# Patient Record
Sex: MALE | NOT HISPANIC OR LATINO | URBAN - METROPOLITAN AREA
[De-identification: names, ages, dates, MRNs, and addresses within clinical notes are randomized per-mention and may not be internally consistent; named-entity substitution may affect disease eponyms.]

---

## 2018-01-23 ENCOUNTER — ALLSCRIPTS OFFICE VISIT (OUTPATIENT)
Dept: OTHER | Facility: OTHER | Age: 33
End: 2018-01-23

## 2018-01-24 NOTE — PROGRESS NOTES
Assessment   1  Wart of scalp (078 10) (B07 9)    Plan     If the destruction does not work with the cryotherapy, call and we will numb it up scrape it and then cauterize it  Discussion/Summary      While wart was the initial diagnosis on my differential, I cannot exclude the possibility that this was a seborrheic keratotic lesion  The difference in some ways may be academic in that cryotherapy is a legitimate treatment for destruction of that benign lesion as well  Chief Complaint   Patient is here today with complaints of a spot on the left side of his head, noticed it a few months ago  History of Present Illness   HPI: Patient has a wart on his scalp just behind the left ear  It is approximately 1 centimeter it is fleshy  Active Problems   1  Pneumonia (5) (J18 9)    Past Medical History   1  No pertinent past medical history    Family History   Father    1  Family history of hypertension (V17 49) (Z82 49)    Social History    · Coffee   · Denied: History of Drinks caffeinated tea   · Never a smoker   · Denied: History of Patient ingests cola containing caffeine   · Social alcohol use (Z78 9)    Surgical History   1  Denied: History Of Prior Surgery    Allergies   1  Amoxicillin TABS    Vitals    Recorded: [id number] 03:49PM   Temperature 97 7 F   Heart Rate 68   Respiration 16   Systolic 751   Diastolic 84   Weight 539 lb    BMI Calculated 26 15   BSA Calculated 1 92     Physical Exam        Skin      Skin and subcutaneous tissue: Abnormal  -- Wart, fleshy behind left ear  Area was cleaned with alcohol and I subjected to for three and half freeze thaw cycles using the cryotherapy  He tolerated it well           Signatures    Electronically signed by : KAREN Osorio ; Jan 23 2018  4:14PM EST                       (Author)

## 2023-01-19 ENCOUNTER — OFFICE VISIT (OUTPATIENT)
Dept: FAMILY MEDICINE CLINIC | Facility: MEDICAL CENTER | Age: 38
End: 2023-01-19

## 2023-01-19 VITALS
RESPIRATION RATE: 16 BRPM | TEMPERATURE: 98.4 F | SYSTOLIC BLOOD PRESSURE: 120 MMHG | BODY MASS INDEX: 26.31 KG/M2 | WEIGHT: 177.6 LBS | HEART RATE: 71 BPM | DIASTOLIC BLOOD PRESSURE: 80 MMHG | HEIGHT: 69 IN

## 2023-01-19 DIAGNOSIS — K40.90 UNILATERAL INGUINAL HERNIA WITHOUT OBSTRUCTION OR GANGRENE, RECURRENCE NOT SPECIFIED: Primary | ICD-10-CM

## 2023-01-19 NOTE — PROGRESS NOTES
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  For the last few weeks he is noted a bulge on the right side over the inguinal region  It is slightly uncomfortable  He is not constipated, he is active, he does not have a bad cough and he does not do a lot of heavy lifting  /80 (Cuff Size: Standard)   Pulse 71   Temp 98 4 °F (36 9 °C)   Resp 16   Ht 5' 9" (1 753 m)   Wt 80 6 kg (177 lb 9 6 oz)   BMI 26 23 kg/m²     Physical Exam  Constitutional:       General: He is not in acute distress  Appearance: He is well-developed  He is not diaphoretic  HENT:      Head: Normocephalic  Right Ear: External ear normal       Left Ear: External ear normal       Nose: Nose normal       Mouth/Throat:      Mouth: Mucous membranes are moist    Eyes:      Extraocular Movements: Extraocular movements intact  Conjunctiva/sclera: Conjunctivae normal       Pupils: Pupils are equal, round, and reactive to light  Cardiovascular:      Rate and Rhythm: Normal rate  Pulses: Normal pulses  Pulmonary:      Effort: Pulmonary effort is normal  No respiratory distress  Abdominal:      Hernia: A hernia (right sided, through the internal ring, but not through the external VS direct hernia (less likely)) is present  Musculoskeletal:         General: Normal range of motion  Cervical back: Normal range of motion  Skin:     General: Skin is warm and dry  Neurological:      General: No focal deficit present  Mental Status: He is alert and oriented to person, place, and time  Psychiatric:         Behavior: Behavior normal          Thought Content: Thought content normal          Judgment: Judgment normal      Inguinal hernia    Referred to general surgery

## 2023-01-24 ENCOUNTER — CONSULT (OUTPATIENT)
Dept: SURGERY | Facility: CLINIC | Age: 38
End: 2023-01-24

## 2023-01-24 VITALS
HEART RATE: 66 BPM | BODY MASS INDEX: 26.21 KG/M2 | WEIGHT: 177.5 LBS | SYSTOLIC BLOOD PRESSURE: 123 MMHG | DIASTOLIC BLOOD PRESSURE: 82 MMHG | TEMPERATURE: 97.2 F

## 2023-01-24 DIAGNOSIS — K40.90 RIGHT INGUINAL HERNIA: Primary | ICD-10-CM

## 2023-01-24 NOTE — H&P (VIEW-ONLY)
Office Visit - General Surgery  Becky Wadsworth MRN: [id number]  Encounter: [id number]    Assessment and Plan  Problem List Items Addressed This Visit        Other    Right inguinal hernia - Primary     Explained to him what a hernia is and how it would be repaired  Discussed the procedure in detail including risks, benefits, alternatives, and what to expect postoperatively  He understands and is agreeable to go ahead  Plan: Right inguinal hernia repair            Chief Complaint:  Becky Wadsworth is a 44 y o  male who presents for Exam And Consultation (Unilateral Inguinal Hernia)    Subjective  40-year-old male who had noticed some discomfort a little bit of pain in the right groin region few weeks ago and also noted a lump in that location  Cannot remember any inciting event in particular  No change in bowel or bladder habits  He was seen by his family doctor found to have a hernia and sent here for further evaluation  History reviewed  No pertinent past medical history  History reviewed  No pertinent surgical history  History reviewed  No pertinent family history  Medications  No current outpatient medications on file prior to visit  No current facility-administered medications on file prior to visit  Allergies  Allergies   Allergen Reactions   • Amoxicillin Swelling       Review of Systems   Constitutional: Negative for chills and fever  HENT: Negative for ear pain and sore throat  Eyes: Negative for pain and visual disturbance  Respiratory: Negative for cough and shortness of breath  Cardiovascular: Negative for chest pain and palpitations  Gastrointestinal: Negative for abdominal pain and vomiting  Genitourinary: Negative for dysuria and hematuria  Musculoskeletal: Negative for arthralgias and back pain  Skin: Negative for color change and rash  Neurological: Negative for seizures and syncope     All other systems reviewed and are negative  Objective  Vitals:    01/24/23 1044   BP: 123/82   Pulse: 66   Temp: (!) 97 2 °F (36 2 °C)       Physical Exam  Vitals and nursing note reviewed  Constitutional:       General: He is not in acute distress  Appearance: He is well-developed  He is not diaphoretic  HENT:      Head: Normocephalic and atraumatic  Right Ear: External ear normal       Left Ear: External ear normal    Eyes:      General: No scleral icterus  Conjunctiva/sclera: Conjunctivae normal    Neck:      Thyroid: No thyromegaly  Trachea: No tracheal deviation  Cardiovascular:      Rate and Rhythm: Normal rate and regular rhythm  Heart sounds: Normal heart sounds  No murmur heard  Pulmonary:      Effort: Pulmonary effort is normal  No respiratory distress  Breath sounds: Normal breath sounds  No wheezing or rales  Abdominal:      General: There is no distension  Palpations: Abdomen is soft  There is no mass  Tenderness: There is no abdominal tenderness  There is no guarding or rebound  Comments: Right inguinal hernia   Musculoskeletal:         General: Normal range of motion  Cervical back: Normal range of motion and neck supple  Lymphadenopathy:      Cervical: No cervical adenopathy  Skin:     General: Skin is warm and dry  Neurological:      Mental Status: He is alert and oriented to person, place, and time  Psychiatric:         Behavior: Behavior normal          Thought Content:  Thought content normal          Judgment: Judgment normal

## 2023-01-24 NOTE — ASSESSMENT & PLAN NOTE
Explained to him what a hernia is and how it would be repaired  Discussed the procedure in detail including risks, benefits, alternatives, and what to expect postoperatively  He understands and is agreeable to go ahead      Plan: Right inguinal hernia repair

## 2023-01-24 NOTE — PROGRESS NOTES
Office Visit - General Surgery  Topher Leon MRN: [id number]  Encounter: [id number]    Assessment and Plan  Problem List Items Addressed This Visit        Other    Right inguinal hernia - Primary     Explained to him what a hernia is and how it would be repaired  Discussed the procedure in detail including risks, benefits, alternatives, and what to expect postoperatively  He understands and is agreeable to go ahead  Plan: Right inguinal hernia repair            Chief Complaint:  Topher Leon is a 44 y o  male who presents for Exam And Consultation (Unilateral Inguinal Hernia)    Subjective  42-year-old male who had noticed some discomfort a little bit of pain in the right groin region few weeks ago and also noted a lump in that location  Cannot remember any inciting event in particular  No change in bowel or bladder habits  He was seen by his family doctor found to have a hernia and sent here for further evaluation  History reviewed  No pertinent past medical history  History reviewed  No pertinent surgical history  History reviewed  No pertinent family history  Medications  No current outpatient medications on file prior to visit  No current facility-administered medications on file prior to visit  Allergies  Allergies   Allergen Reactions   • Amoxicillin Swelling       Review of Systems   Constitutional: Negative for chills and fever  HENT: Negative for ear pain and sore throat  Eyes: Negative for pain and visual disturbance  Respiratory: Negative for cough and shortness of breath  Cardiovascular: Negative for chest pain and palpitations  Gastrointestinal: Negative for abdominal pain and vomiting  Genitourinary: Negative for dysuria and hematuria  Musculoskeletal: Negative for arthralgias and back pain  Skin: Negative for color change and rash  Neurological: Negative for seizures and syncope     All other systems reviewed and are negative  Objective  Vitals:    01/24/23 1044   BP: 123/82   Pulse: 66   Temp: (!) 97 2 °F (36 2 °C)       Physical Exam  Vitals and nursing note reviewed  Constitutional:       General: He is not in acute distress  Appearance: He is well-developed  He is not diaphoretic  HENT:      Head: Normocephalic and atraumatic  Right Ear: External ear normal       Left Ear: External ear normal    Eyes:      General: No scleral icterus  Conjunctiva/sclera: Conjunctivae normal    Neck:      Thyroid: No thyromegaly  Trachea: No tracheal deviation  Cardiovascular:      Rate and Rhythm: Normal rate and regular rhythm  Heart sounds: Normal heart sounds  No murmur heard  Pulmonary:      Effort: Pulmonary effort is normal  No respiratory distress  Breath sounds: Normal breath sounds  No wheezing or rales  Abdominal:      General: There is no distension  Palpations: Abdomen is soft  There is no mass  Tenderness: There is no abdominal tenderness  There is no guarding or rebound  Comments: Right inguinal hernia   Musculoskeletal:         General: Normal range of motion  Cervical back: Normal range of motion and neck supple  Lymphadenopathy:      Cervical: No cervical adenopathy  Skin:     General: Skin is warm and dry  Neurological:      Mental Status: He is alert and oriented to person, place, and time  Psychiatric:         Behavior: Behavior normal          Thought Content:  Thought content normal          Judgment: Judgment normal  no

## 2023-02-20 ENCOUNTER — ANESTHESIA EVENT (OUTPATIENT)
Dept: PERIOP | Facility: HOSPITAL | Age: 38
End: 2023-02-20

## 2023-02-20 NOTE — PRE-PROCEDURE INSTRUCTIONS
NPO after midnight, meds in am with sips of water  Understands when to expect preop phone call  Remove all jewelry  Advised of visitation policy  Before your operation, you play an important role in decreasing your risk for infection by washing with special antiseptic soap  This is an effective way to reduce bacteria on the skin which may help to prevent infections at the surgical site  Please read the following directions in advance  1  In the week before your operation purchase a 4 ounce bottle of antiseptic soap containing chlorhexidine gluconate 4%  Some brand names include: Aplicare, Endure, and Hibiclens  The cost is usually less than $5 00  · For your convenience, the [address] [place] [address] carries the soap  · It may also be available at your doctor's office or pre-admission testing center, and at most retail pharmacies  · If you are allergic or sensitive to soaps containing chlorhexidine gluconate (CHG), please let your doctor know so another antiseptic soap can be suggested  · CHG antiseptic soap is for external use only  2      The day before your operation follow these directions carefully to get ready  · Place clean lines (sheets) on your bed; you should sleep on clean sheets after your evening shower  · Get clean towels and washcloths ready - you need enough for 2 showers  · Set aside clean underwear, pajamas, and clothing to wear after the shower  Reminders:  · DO NOT use any other soap or body rinse on your skin during or after the antiseptic showers  · DO NOT use lotion , powder, deodorant, or perfume/aftershave of any kind on your skin after your antiseptic shower  · DO NOT shave any body parts in the 24 hours/the day before your operation  · DO NOT get the antiseptic soap in your eyes, ears, nose, mouth, or vaginal area  3      You will need to shower the night before AND the morning of your Surgery     Shower 1:  · The evening before your operation, take the fist shower  · First, shampoo your hair with regular shampoo and rinse it completely before you use the anitseptic soap  After washing and rinsing your hair, rinse your body  · Next, use a clean wash cloth to apply the antiseptic soap and wash your body from the neck down to your toes using 1/2 bottle of the antiseptic soap  You will use the other 1/2 bottle for the second shower  · Clean the area where your incision will be; later this area well for about 2 minutes  · If you ar having head or neck surgery, wash areas with the antiseptic soap  · Rinse yourself completely with running water  · Use a clean towel to dry off  · Wear clean underwear and clothing/pajamas  Shower 2:  · The Morning of your operation, take the second shower following the same steps as Shower 1 using the second 1/2 of the bottle of antiseptic soap  · Use clean cloths and towels to was and dry yourself off  · Wear clean underwear and clothing

## 2023-02-21 ENCOUNTER — ANESTHESIA (OUTPATIENT)
Dept: PERIOP | Facility: HOSPITAL | Age: 38
End: 2023-02-21

## 2023-02-21 ENCOUNTER — HOSPITAL ENCOUNTER (OUTPATIENT)
Facility: HOSPITAL | Age: 38
Setting detail: OUTPATIENT SURGERY
Discharge: HOME | End: 2023-02-21
Attending: SURGERY | Admitting: SURGERY

## 2023-02-21 VITALS
BODY MASS INDEX: 26.22 KG/M2 | HEIGHT: 69 IN | OXYGEN SATURATION: 99 % | WEIGHT: 177 LBS | HEART RATE: 76 BPM | DIASTOLIC BLOOD PRESSURE: 66 MMHG | RESPIRATION RATE: 16 BRPM | SYSTOLIC BLOOD PRESSURE: 120 MMHG | TEMPERATURE: 97.8 F

## 2023-02-21 DIAGNOSIS — K40.90 RIGHT INGUINAL HERNIA: Primary | ICD-10-CM

## 2023-02-21 DEVICE — BARD MESH PRE-SHAPED WITH KEYHOLE
Type: IMPLANTABLE DEVICE | Site: GROIN | Status: FUNCTIONAL
Brand: BARD MESH PRE-SHAPED

## 2023-02-21 RX ORDER — MIDAZOLAM HYDROCHLORIDE 2 MG/2ML
INJECTION, SOLUTION INTRAMUSCULAR; INTRAVENOUS AS NEEDED
Status: DISCONTINUED | OUTPATIENT
Start: 2023-02-21 | End: 2023-02-21

## 2023-02-21 RX ORDER — ONDANSETRON 2 MG/ML
4 INJECTION INTRAMUSCULAR; INTRAVENOUS ONCE
Status: DISCONTINUED | OUTPATIENT
Start: 2023-02-21 | End: 2023-02-21 | Stop reason: HOSPADM

## 2023-02-21 RX ORDER — PROPOFOL 10 MG/ML
INJECTION, EMULSION INTRAVENOUS AS NEEDED
Status: DISCONTINUED | OUTPATIENT
Start: 2023-02-21 | End: 2023-02-21

## 2023-02-21 RX ORDER — SODIUM CHLORIDE, SODIUM LACTATE, POTASSIUM CHLORIDE, CALCIUM CHLORIDE 600; 310; 30; 20 MG/100ML; MG/100ML; MG/100ML; MG/100ML
20 INJECTION, SOLUTION INTRAVENOUS CONTINUOUS
Status: DISCONTINUED | OUTPATIENT
Start: 2023-02-21 | End: 2023-02-21 | Stop reason: HOSPADM

## 2023-02-21 RX ORDER — OXYCODONE HYDROCHLORIDE 5 MG/1
5 TABLET ORAL EVERY 4 HOURS PRN
Qty: 10 TABLET | Refills: 0 | Status: SHIPPED | OUTPATIENT
Start: 2023-02-21

## 2023-02-21 RX ORDER — MAGNESIUM HYDROXIDE 1200 MG/15ML
LIQUID ORAL AS NEEDED
Status: DISCONTINUED | OUTPATIENT
Start: 2023-02-21 | End: 2023-02-21 | Stop reason: HOSPADM

## 2023-02-21 RX ORDER — ONDANSETRON 2 MG/ML
4 INJECTION INTRAMUSCULAR; INTRAVENOUS ONCE AS NEEDED
Status: DISCONTINUED | OUTPATIENT
Start: 2023-02-21 | End: 2023-02-21 | Stop reason: HOSPADM

## 2023-02-21 RX ORDER — KETOROLAC TROMETHAMINE 30 MG/ML
INJECTION, SOLUTION INTRAMUSCULAR; INTRAVENOUS AS NEEDED
Status: DISCONTINUED | OUTPATIENT
Start: 2023-02-21 | End: 2023-02-21

## 2023-02-21 RX ORDER — CLINDAMYCIN PHOSPHATE 900 MG/50ML
900 INJECTION INTRAVENOUS ONCE
Status: COMPLETED | OUTPATIENT
Start: 2023-02-21 | End: 2023-02-21

## 2023-02-21 RX ORDER — BUPIVACAINE HYDROCHLORIDE AND EPINEPHRINE 5; 5 MG/ML; UG/ML
INJECTION, SOLUTION PERINEURAL AS NEEDED
Status: DISCONTINUED | OUTPATIENT
Start: 2023-02-21 | End: 2023-02-21 | Stop reason: HOSPADM

## 2023-02-21 RX ORDER — LIDOCAINE HYDROCHLORIDE 10 MG/ML
INJECTION, SOLUTION EPIDURAL; INFILTRATION; INTRACAUDAL; PERINEURAL AS NEEDED
Status: DISCONTINUED | OUTPATIENT
Start: 2023-02-21 | End: 2023-02-21

## 2023-02-21 RX ORDER — DEXAMETHASONE SODIUM PHOSPHATE 10 MG/ML
INJECTION, SOLUTION INTRAMUSCULAR; INTRAVENOUS AS NEEDED
Status: DISCONTINUED | OUTPATIENT
Start: 2023-02-21 | End: 2023-02-21

## 2023-02-21 RX ORDER — HYDROMORPHONE HCL/PF 1 MG/ML
0.5 SYRINGE (ML) INJECTION
Status: DISCONTINUED | OUTPATIENT
Start: 2023-02-21 | End: 2023-02-21 | Stop reason: HOSPADM

## 2023-02-21 RX ORDER — FENTANYL CITRATE/PF 50 MCG/ML
25 SYRINGE (ML) INJECTION
Status: DISCONTINUED | OUTPATIENT
Start: 2023-02-21 | End: 2023-02-21 | Stop reason: HOSPADM

## 2023-02-21 RX ORDER — HYDROMORPHONE HCL/PF 1 MG/ML
0.2 SYRINGE (ML) INJECTION EVERY 4 HOURS PRN
Status: DISCONTINUED | OUTPATIENT
Start: 2023-02-21 | End: 2023-02-21 | Stop reason: HOSPADM

## 2023-02-21 RX ORDER — FENTANYL CITRATE 50 UG/ML
INJECTION, SOLUTION INTRAMUSCULAR; INTRAVENOUS AS NEEDED
Status: DISCONTINUED | OUTPATIENT
Start: 2023-02-21 | End: 2023-02-21

## 2023-02-21 RX ORDER — SODIUM CHLORIDE, SODIUM LACTATE, POTASSIUM CHLORIDE, CALCIUM CHLORIDE 600; 310; 30; 20 MG/100ML; MG/100ML; MG/100ML; MG/100ML
INJECTION, SOLUTION INTRAVENOUS CONTINUOUS PRN
Status: DISCONTINUED | OUTPATIENT
Start: 2023-02-21 | End: 2023-02-21

## 2023-02-21 RX ORDER — ONDANSETRON 2 MG/ML
INJECTION INTRAMUSCULAR; INTRAVENOUS AS NEEDED
Status: DISCONTINUED | OUTPATIENT
Start: 2023-02-21 | End: 2023-02-21

## 2023-02-21 RX ORDER — HYDROCODONE BITARTRATE AND ACETAMINOPHEN 5; 325 MG/1; MG/1
1 TABLET ORAL EVERY 4 HOURS PRN
Status: DISCONTINUED | OUTPATIENT
Start: 2023-02-21 | End: 2023-02-21 | Stop reason: HOSPADM

## 2023-02-21 RX ADMIN — FENTANYL CITRATE 50 MCG: 50 INJECTION INTRAMUSCULAR; INTRAVENOUS at 14:10

## 2023-02-21 RX ADMIN — DEXAMETHASONE SODIUM PHOSPHATE 10 MG: 10 INJECTION INTRAMUSCULAR; INTRAVENOUS at 14:00

## 2023-02-21 RX ADMIN — FENTANYL CITRATE 50 MCG: 50 INJECTION INTRAMUSCULAR; INTRAVENOUS at 14:50

## 2023-02-21 RX ADMIN — MIDAZOLAM 2 MG: 1 INJECTION INTRAMUSCULAR; INTRAVENOUS at 13:47

## 2023-02-21 RX ADMIN — CLINDAMYCIN PHOSPHATE 900 MG: 900 INJECTION, SOLUTION INTRAVENOUS at 14:00

## 2023-02-21 RX ADMIN — SODIUM CHLORIDE, SODIUM LACTATE, POTASSIUM CHLORIDE, AND CALCIUM CHLORIDE: .6; .31; .03; .02 INJECTION, SOLUTION INTRAVENOUS at 15:04

## 2023-02-21 RX ADMIN — PROPOFOL 200 MG: 10 INJECTION, EMULSION INTRAVENOUS at 13:55

## 2023-02-21 RX ADMIN — LIDOCAINE HYDROCHLORIDE 50 MG: 10 INJECTION, SOLUTION EPIDURAL; INFILTRATION; INTRACAUDAL; PERINEURAL at 13:55

## 2023-02-21 RX ADMIN — FENTANYL CITRATE 50 MCG: 50 INJECTION INTRAMUSCULAR; INTRAVENOUS at 13:58

## 2023-02-21 RX ADMIN — SODIUM CHLORIDE, SODIUM LACTATE, POTASSIUM CHLORIDE, AND CALCIUM CHLORIDE 20 ML/HR: .6; .31; .03; .02 INJECTION, SOLUTION INTRAVENOUS at 13:16

## 2023-02-21 RX ADMIN — SODIUM CHLORIDE, SODIUM LACTATE, POTASSIUM CHLORIDE, AND CALCIUM CHLORIDE: .6; .31; .03; .02 INJECTION, SOLUTION INTRAVENOUS at 13:49

## 2023-02-21 RX ADMIN — KETOROLAC TROMETHAMINE 15 MG: 30 INJECTION, SOLUTION INTRAMUSCULAR; INTRAVENOUS at 15:04

## 2023-02-21 RX ADMIN — ONDANSETRON HYDROCHLORIDE 4 MG: 2 INJECTION, SOLUTION INTRAMUSCULAR; INTRAVENOUS at 15:03

## 2023-02-21 NOTE — ANESTHESIA POSTPROCEDURE EVALUATION
Post-Op Assessment Note    CV Status:  Stable  Pain Score: 0    Pain management: adequate     Mental Status:  Awake   Hydration Status:  Euvolemic   PONV Controlled:  Controlled   Airway Patency:  Patent      Post Op Vitals Reviewed: Yes      Staff: CRNA, Anesthesiologist         No notable events documented      BP   104/58   Temp   98 8   Pulse  90   Resp   12   SpO2   99%

## 2023-02-21 NOTE — OP NOTE
OPERATIVE REPORT  PATIENT NAME: Ty Meyer    :  1985  MRN: [id number]  Pt Location:  OR ROOM 08    SURGERY DATE: 2023    Surgeon(s) and Role:     * Arabella Herrmann MD - Primary     * Fernando Bullock MD - Assisting    Preop Diagnosis:  Right inguinal hernia [K40 90]    Post-Op Diagnosis Codes:     * Right inguinal hernia [K40 90]    Procedure(s):  Right - REPAIR HERNIA INGUINAL    Specimen(s):  * No specimens in log *    Estimated Blood Loss:   Minimal    Drains:  * No LDAs found *    Anesthesia Type:   General    Operative Indications:  Right inguinal hernia [K40 90]    Operative Findings:  Direct inguinal hernia repaired w/ onlay mesh    Complications:   None    Procedure and Technique:  Patient was brought to the operating room where he was identified verbally and via hospital supplied armband  He was transferred to the operating room table in the supine position  General anesthesia was induced and the patient was endotracheally intubated  A brief timeout was performed confirming the correct patient, procedure, site/laterality and with all parties in agreement we proceeded  A skin incision was made in the right groin using the patient's natural skin crease with a #15 blade incision was deepened to the external oblique aponeurosis using Bovie electrocautery  The external oblique aponeurosis was opened in the direction of its fibers over a gloved finger  The ilioinguinal nerve was identified and divided sharply  The flaps of the external oblique were developed  The spermatic cord was identified and gently dissected free at the pubic tubercle and encircled with a Penrose drain  Attention was then directed to the anterior medial aspect of the cord where a small indirect hernia sac was identified  The sac was carefully dissected free of the cord down to the level of the internal ring  The vas deferens and testicular vessels were identified and protected from harm  The sac was reduced  We then turned our attention to the floor of the inguinal canal which was grossly weakened without a well-defined defect or sac  2-0 Ethibond was used in a running fashion to imbricate the redundant floor  The wound was irrigated  We then selected a propylene mesh which was sutured to the pubic tubercle using double-armed 2-0 Prolene  This was run continuously along inguinal ligament inferiorly and conjoined tendon superiorly  Care was taken to ensure that the mesh was placed in a relaxed fashion to avoid excess tension in that no neurovascular structures were caught in the repair  Laterally the tails of the mesh were crossed and the internal ring reconstituted  Hemostasis was inspected  The Penrose drain was removed  The external oblique aponeurosis was closed with a running 2-0 Vicryl  Ritu's fascia was closed with 2-0 Vicryl in a simple interrupted fashion  Skin was closed with 4-0 Monocryl and surgical glue  General anesthesia was gently reversed and the patient was allowed to awaken whereupon they were extubated and returned to the PACU in stable condition  At the conclusion of the case all sponge, needle, instrument counts were correct and RF wanding was negative x2  Dr OCONNOR [facility] [place] was present for the entire procedure      Patient Disposition:  PACU  and extubated and stable      SIGNATURE: Gigi Rubalcava MD  DATE: February 21, 2023  TIME: 3:26 PM

## 2023-02-21 NOTE — DISCHARGE INSTR - AVS FIRST PAGE
After Surgery Instructions    ? ? ?When you return home, you may eat whatever you feel comfortable eating  It is common to not have much of an appetite  Do not force yourself to eat  Your appetite will usually return in 1 or 2 weeks  Some foods may still not agree with you, but this will usually pass in 4 to 6 weeks  ? ? ? Resume all of your regular medications, including blood thinners and aspirin, after going home  ? ? ? The day after surgery you may remove the dressings  Leave any skin tapes on the incisions in place  A dressing is then optional   ? ??You may shower the day after surgery  Plain soap and water is fine  Special soaps, ointments, alcohol or peroxide is not necessary  No swimming in pools for 5 days, and do not go in the ocean until seen in the office  ? ??You may become constipated, especially if taking pain medications, for the first 3 or 4 days  You may take any over the counter laxative  [address] is good to start  ? ??You will be given a prescription for pain medication  Take it as needed only  You may also take any over the counter medication for more mild pain  ? ??Immediately after surgery, you may ride in a car, climb steps and walk as tolerated  When you are pain free, it is Ok to drive  Be aware that you will tire out very easily for the first few days  ? ? ? Do not lift anything over 15 pounds for one week  Otherwise, there are not specific limitations to your activity and you may resume normal activity as tolerated  After surgery you may return back to work on 1-2 weeks when comfortable      [address] [place] [address] at [phone] if any of the following occur:    ? ? ?A fever over 101° that does not respond to Tylenol  A low grade fever is not unusual after surgery and is not necessarily a sign of infection  ? ??Increasing abdominal pain   Some pain after surgery is normal  Pain that was improving but has now gotten increasingly worse may not be normal and should be reported  ? ? ?Persistent nausea and vomiting  IF YOU HAVE ANY QUESTIONS OR CONCERNS PLEASE FEEL FREE TO CONTACT US AT ANY TIME

## 2023-02-21 NOTE — ANESTHESIA PREPROCEDURE EVALUATION
Procedure:  REPAIR HERNIA INGUINAL (Right: Groin)    Relevant Problems   No relevant active problems             Anesthesia Plan  ASA Score- 1     Anesthesia Type- general with ASA Monitors  Additional Monitors:   Airway Plan: LMA  Comment: General anesthesia, LMA; standard ASA monitors  Risks and benefits discussed with patient; patient consented and agrees to proceed  I saw and evaluated the patient  If seen with CRNA, we have discussed the anesthetic plan and I am in agreement that the plan is appropriate for the patient          Plan Factors-    Chart reviewed  Existing labs reviewed  Induction- intravenous  Postoperative Plan- Plan for postoperative opioid use  Planned trial extubation    Informed Consent- Anesthetic plan and risks discussed with patient  I personally reviewed this patient with the CRNA  Discussed and agreed on the Anesthesia Plan with the CRNA  Osmani Davis

## 2023-02-21 NOTE — INTERVAL H&P NOTE
H&P reviewed  After examining the patient I find no changes in the patients condition since the H&P had been written    Plan: right inguinal hernia repair

## 2023-03-07 ENCOUNTER — OFFICE VISIT (OUTPATIENT)
Dept: SURGERY | Facility: CLINIC | Age: 38
End: 2023-03-07

## 2023-03-07 VITALS — TEMPERATURE: 97.8 F | WEIGHT: 178.8 LBS | BODY MASS INDEX: 26.48 KG/M2 | HEIGHT: 69 IN

## 2023-03-07 DIAGNOSIS — Z98.890 STATUS POST RIGHT INGUINAL HERNIA REPAIR: Primary | ICD-10-CM

## 2023-03-07 DIAGNOSIS — Z87.19 STATUS POST RIGHT INGUINAL HERNIA REPAIR: Primary | ICD-10-CM

## 2023-03-07 PROBLEM — K40.90 RIGHT INGUINAL HERNIA: Status: RESOLVED | Noted: 2023-01-24 | Resolved: 2023-03-07

## 2023-03-07 NOTE — PROGRESS NOTES
Office Visit - General Surgery  Kim Jackson MRN: [id number]  Encounter: [id number]    Assessment and Plan  Problem List Items Addressed This Visit        Other    Status post right inguinal hernia repair - Primary     Doing well at this time  Increase activity as he feels comfortable  See him back here if needed  Chief Complaint:  Kim Jackson is a 44 y o  male who presents for Post-op (P/o right inguinal hernia repair )    Subjective  79-year-old male status post right inguinal hernia repair no major complaints at this time  Some pain which is getting better  No problems of bowel or bladder habits    Past Medical History:   Diagnosis Date   • COVID-19 05/18/2022       Past Surgical History:   Procedure Laterality Date   • NV RPR 1ST INGUN HRNA AGE 5 YRS/> REDUCIBLE Right 2/21/2023    Procedure: REPAIR HERNIA INGUINAL;  Surgeon: Beto Wooten MD;  Location: [facility];  Service: General       History reviewed  No pertinent family history  Social History     Tobacco Use   • Smoking status: Never   • Smokeless tobacco: Never   Vaping Use   • Vaping Use: Never used   Substance Use Topics   • Alcohol use: Yes     Comment: 4 x year   • Drug use: Never        Medications  Current Outpatient Medications on File Prior to Visit   Medication Sig Dispense Refill   • oxyCODONE (ROXICODONE) 5 immediate release tablet Take 1 tablet (5 mg total) by mouth every 4 (four) hours as needed for moderate pain for up to 10 doses Max Daily Amount: 30 mg (Patient not taking: Reported on 3/7/2023) 10 tablet 0     No current facility-administered medications on file prior to visit         Allergies  Allergies   Allergen Reactions   • Amoxicillin Swelling     "as a child"       Review of Systems    Objective  Vitals:    03/07/23 1118   Temp: 97 8 °F (36 6 °C)       Physical Exam   Abdomen: Right groin incision healing well with typical healing ridge, soft, nontender

## (undated) DEVICE — ADHESIVE SKIN HIGH VISCOSITY EXOFIN 1ML

## (undated) DEVICE — SUT MONOCRYL 4-0 PS-2 27 IN Y426H

## (undated) DEVICE — PENROSE DRAIN, 18 X 3 8: Brand: CARDINAL HEALTH

## (undated) DEVICE — ROSEBUD DISSECTORS: Brand: DEROYAL

## (undated) DEVICE — GLOVE SRG BIOGEL ORTHOPEDIC 7.5

## (undated) DEVICE — NEEDLE 25G X 1 1/2

## (undated) DEVICE — INTENDED FOR TISSUE SEPARATION, AND OTHER PROCEDURES THAT REQUIRE A SHARP SURGICAL BLADE TO PUNCTURE OR CUT.: Brand: BARD-PARKER SAFETY BLADES SIZE 15, STERILE

## (undated) DEVICE — GLOVE INDICATOR PI UNDERGLOVE SZ 8 BLUE

## (undated) DEVICE — ANTIBACTERIAL UNDYED BRAIDED (POLYGLACTIN 910), SYNTHETIC ABSORBABLE SUTURE: Brand: COATED VICRYL

## (undated) DEVICE — BETHLEHEM UNIVERSAL MINOR GEN: Brand: CARDINAL HEALTH

## (undated) DEVICE — SUT PROLENE 2-0 RB-1/RB-1 36 IN 8559H

## (undated) DEVICE — SUT ETHIBOND 0 SH 30 IN X834H

## (undated) DEVICE — PLUMEPEN PRO 10FT

## (undated) DEVICE — SUT ETHIBOND 0 SH/SH 36 IN X524H

## (undated) DEVICE — CHLORAPREP HI-LITE 26ML ORANGE